# Patient Record
Sex: FEMALE | Race: WHITE | NOT HISPANIC OR LATINO | URBAN - METROPOLITAN AREA
[De-identification: names, ages, dates, MRNs, and addresses within clinical notes are randomized per-mention and may not be internally consistent; named-entity substitution may affect disease eponyms.]

---

## 2018-12-07 ENCOUNTER — OFFICE VISIT (OUTPATIENT)
Dept: URBAN - METROPOLITAN AREA CLINIC 32 | Facility: CLINIC | Age: 28
End: 2018-12-07
Payer: COMMERCIAL

## 2018-12-07 DIAGNOSIS — H52.13 MYOPIA, BILATERAL: Primary | ICD-10-CM

## 2018-12-07 PROCEDURE — 92015 DETERMINE REFRACTIVE STATE: CPT | Performed by: OPTOMETRIST

## 2018-12-07 PROCEDURE — 92014 COMPRE OPH EXAM EST PT 1/>: CPT | Performed by: OPTOMETRIST

## 2018-12-07 ASSESSMENT — VISUAL ACUITY
OS: 20/20
OD: 20/20

## 2018-12-07 ASSESSMENT — INTRAOCULAR PRESSURE
OS: 25
OD: 25

## 2020-02-04 ENCOUNTER — OFFICE VISIT (OUTPATIENT)
Dept: URBAN - METROPOLITAN AREA CLINIC 32 | Facility: CLINIC | Age: 30
End: 2020-02-04
Payer: COMMERCIAL

## 2020-02-04 PROCEDURE — 92014 COMPRE OPH EXAM EST PT 1/>: CPT | Performed by: OPTOMETRIST

## 2020-02-04 ASSESSMENT — VISUAL ACUITY
OS: 20/20
OD: 20/20

## 2020-02-04 ASSESSMENT — INTRAOCULAR PRESSURE
OS: 21
OD: 22

## 2020-12-17 ENCOUNTER — OFFICE VISIT (OUTPATIENT)
Dept: URBAN - METROPOLITAN AREA CLINIC 22 | Facility: CLINIC | Age: 30
End: 2020-12-17
Payer: COMMERCIAL

## 2020-12-17 DIAGNOSIS — H01.009 UNSPECIFIED BLEPHARITIS UNSPECIFIED EYE, UNSPECIFIED EYELID: Primary | ICD-10-CM

## 2020-12-17 PROCEDURE — 99214 OFFICE O/P EST MOD 30 MIN: CPT | Performed by: OPTOMETRIST

## 2020-12-17 RX ORDER — NEOMYCIN SULFATE, POLYMYXIN B SULFATE AND DEXAMETHASONE 3.5; 10000; 1 MG/ML; [USP'U]/ML; MG/ML
SUSPENSION OPHTHALMIC
Qty: 5 | Refills: 0 | Status: ACTIVE
Start: 2020-12-17

## 2020-12-17 RX ORDER — NEOMYCIN SULFATE, POLYMYXIN B SULFATE AND DEXAMETHASONE 3.5; 10000; 1 MG/G; [USP'U]/G; MG/G
OINTMENT OPHTHALMIC
Qty: 3.5 | Refills: 0 | Status: ACTIVE
Start: 2020-12-17

## 2020-12-17 ASSESSMENT — INTRAOCULAR PRESSURE
OD: 23
OS: 30

## 2020-12-17 NOTE — IMPRESSION/PLAN
Impression: Unspecified blepharitis unspecified eye, unspecified eyelid: H01.009.  Bilateral. Plan: pt is to start maxitrol ointment and drops with warm compresses and baby shampoo